# Patient Record
(demographics unavailable — no encounter records)

---
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
## 2024-11-15 NOTE — CONSULT LETTER
[FreeTextEntry3] : \par  Ami Miller MD\par  Chief, Division of Pediatric Pulmonary [facility]\par   of Pediatrics\par  [facility]\par  [facility] [employer] at [facility]\par

## 2024-11-15 NOTE — END OF VISIT
[FreeTextEntry3] : I, Lisa Addison, HENRY-BC have acted as a scribe and documented the HPI information for Dr Miller. The HPI documentation completed by the scribe is an accurate record of both my words and actions.\par
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
